# Patient Record
Sex: MALE | Race: BLACK OR AFRICAN AMERICAN
[De-identification: names, ages, dates, MRNs, and addresses within clinical notes are randomized per-mention and may not be internally consistent; named-entity substitution may affect disease eponyms.]

---

## 2021-02-15 ENCOUNTER — HOSPITAL ENCOUNTER (EMERGENCY)
Dept: HOSPITAL 63 - ER | Age: 36
LOS: 1 days | Discharge: HOME | End: 2021-02-16
Payer: OTHER GOVERNMENT

## 2021-02-15 VITALS — HEIGHT: 69 IN | WEIGHT: 215.39 LBS | BODY MASS INDEX: 31.9 KG/M2

## 2021-02-15 DIAGNOSIS — F43.10: ICD-10-CM

## 2021-02-15 DIAGNOSIS — F41.9: ICD-10-CM

## 2021-02-15 DIAGNOSIS — R07.2: ICD-10-CM

## 2021-02-15 DIAGNOSIS — F17.220: ICD-10-CM

## 2021-02-15 DIAGNOSIS — I21.3: Primary | ICD-10-CM

## 2021-02-15 DIAGNOSIS — I10: ICD-10-CM

## 2021-02-15 DIAGNOSIS — Z20.822: ICD-10-CM

## 2021-02-15 PROCEDURE — 96365 THER/PROPH/DIAG IV INF INIT: CPT

## 2021-02-15 PROCEDURE — 96376 TX/PRO/DX INJ SAME DRUG ADON: CPT

## 2021-02-15 PROCEDURE — 93005 ELECTROCARDIOGRAM TRACING: CPT

## 2021-02-15 PROCEDURE — 96375 TX/PRO/DX INJ NEW DRUG ADDON: CPT

## 2021-02-15 PROCEDURE — 71045 X-RAY EXAM CHEST 1 VIEW: CPT

## 2021-02-15 PROCEDURE — 85025 COMPLETE CBC W/AUTO DIFF WBC: CPT

## 2021-02-15 PROCEDURE — 84484 ASSAY OF TROPONIN QUANT: CPT

## 2021-02-15 PROCEDURE — 80048 BASIC METABOLIC PNL TOTAL CA: CPT

## 2021-02-15 PROCEDURE — 85730 THROMBOPLASTIN TIME PARTIAL: CPT

## 2021-02-15 PROCEDURE — 36415 COLL VENOUS BLD VENIPUNCTURE: CPT

## 2021-02-15 PROCEDURE — 87426 SARSCOV CORONAVIRUS AG IA: CPT

## 2021-02-15 PROCEDURE — 85610 PROTHROMBIN TIME: CPT

## 2021-02-15 PROCEDURE — 99285 EMERGENCY DEPT VISIT HI MDM: CPT

## 2021-02-15 PROCEDURE — U0003 INFECTIOUS AGENT DETECTION BY NUCLEIC ACID (DNA OR RNA); SEVERE ACUTE RESPIRATORY SYNDROME CORONAVIRUS 2 (SARS-COV-2) (CORONAVIRUS DISEASE [COVID-19]), AMPLIFIED PROBE TECHNIQUE, MAKING USE OF HIGH THROUGHPUT TECHNOLOGIES AS DESCRIBED BY CMS-2020-01-R: HCPCS

## 2021-02-16 VITALS — SYSTOLIC BLOOD PRESSURE: 122 MMHG | DIASTOLIC BLOOD PRESSURE: 75 MMHG

## 2021-02-16 LAB
ANION GAP SERPL CALC-SCNC: 13 MMOL/L (ref 6–14)
BASOPHILS # BLD AUTO: 0.1 X10^3/UL (ref 0–0.2)
BASOPHILS NFR BLD: 1 % (ref 0–3)
CA-I SERPL ISE-MCNC: 19 MG/DL (ref 8–26)
CALCIUM SERPL-MCNC: 8.9 MG/DL (ref 8.5–10.1)
CHLORIDE SERPL-SCNC: 106 MMOL/L (ref 98–107)
CO2 SERPL-SCNC: 25 MMOL/L (ref 21–32)
CREAT SERPL-MCNC: 1.2 MG/DL (ref 0.7–1.3)
EOSINOPHIL NFR BLD: 0.1 X10^3/UL (ref 0–0.7)
EOSINOPHIL NFR BLD: 2 % (ref 0–3)
ERYTHROCYTE [DISTWIDTH] IN BLOOD BY AUTOMATED COUNT: 12 % (ref 11.5–14.5)
GFR SERPLBLD BASED ON 1.73 SQ M-ARVRAT: 83.9 ML/MIN
GLUCOSE SERPL-MCNC: 111 MG/DL (ref 70–99)
HCT VFR BLD CALC: 40.3 % (ref 39–53)
HGB BLD-MCNC: 13.8 G/DL (ref 13–17.5)
LYMPHOCYTES # BLD: 2.7 X10^3/UL (ref 1–4.8)
LYMPHOCYTES NFR BLD AUTO: 51 % (ref 24–48)
MCH RBC QN AUTO: 32 PG (ref 25–35)
MCHC RBC AUTO-ENTMCNC: 34 G/DL (ref 31–37)
MCV RBC AUTO: 93 FL (ref 79–100)
MONO #: 0.5 X10^3/UL (ref 0–1.1)
MONOCYTES NFR BLD: 9 % (ref 0–9)
NEUT #: 2 X10^3UL (ref 1.8–7.7)
NEUTROPHILS NFR BLD AUTO: 37 % (ref 31–73)
PLATELET # BLD AUTO: 238 X10^3/UL (ref 140–400)
POTASSIUM SERPL-SCNC: 3.3 MMOL/L (ref 3.5–5.1)
RBC # BLD AUTO: 4.35 X10^6/UL (ref 4.3–5.7)
SODIUM SERPL-SCNC: 144 MMOL/L (ref 136–145)
WBC # BLD AUTO: 5.4 X10^3/UL (ref 4–11)

## 2021-02-16 NOTE — EKG
[facility] [address], [place] [address]

Test Date:    2021               Test Time:    00:58:40

Pat Name:     BILL GRAF          Department:   

Patient ID:   [id number]           Room:          

Gender:       M                        Technician:   

:          1985               Requested By: CELINA VILLALTA

Order Number: [id number]            Reading MD:     

                                 Measurements

Intervals                              Axis          

Rate:         97                       P:            11

CT:           120                      QRS:          36

QRSD:         80                       T:            67

QT:           358                                    

QTc:          459                                    

                           Interpretive Statements

SINUS RHYTHM

LEFT ATRIAL ABNORMALITY

QRS(T) CONTOUR ABNORMALITY

CONSISTENT WITH INFERIOR INFARCT

PROBABLY OLD

ABNORMAL ECG

RI6.02

No previous ECG available for comparison

## 2021-02-16 NOTE — EKG
[facility] [address], [place] [address]

Test Date:    2021               Test Time:    00:01:57

Pat Name:     BILL MONTALVO          Department:   

Patient ID:   [id number]           Room:          

Gender:       M                        Technician:   

:          1985               Requested By: CELINA VILLALTA

Order Number: [id number]            Reading MD:     

                                 Measurements

Intervals                              Axis          

Rate:         105                      P:            38

NM:           126                      QRS:          5

QRSD:         82                       T:            -6

QT:           356                                    

QTc:          475                                    

                           Interpretive Statements

SINUS TACHYCARDIA

LEFT ATRIAL ABNORMALITY

T ABNORMALITY IN INFERIOR LEADS

ABNORMAL ECG

RI6.02

No previous ECG available for comparison

## 2021-02-16 NOTE — PHYS DOC
Past History


Past Medical History:  Anxiety, Hypertension, Other


Additional Past Medical Histor:  PTSD, HERNIA


Past Surgical History:  Other


Additional Past Surgical Histo:  HERNIA REPAIR


Additional Smoking Information:  


CHEWING TOBACCO


Alcohol Use:  Heavy


Additional Alcohol Information:  


1/2 PINT RUM





Adult General


Chief Complaint


Chief Complaint:  CHEST PAIN





HPI


HPI


Patient is a 35-year-old male who presents with substernal chest pain for 

approximately 3 hours before coming to the ED.  States it is substernal in 

nature with some radiation to his left side, 8 out of 10, sharp in nature.  

States he never had anything like this before.  Denies any recent dyspnea on 

exertion, PND, orthopnea or edema.  States he is an alcoholic and drinks daily 

but denies any other drug use.  States he is a smoker as well.  States he had 

Covid in December and symptoms resolved.  Denies any recent travel, other 

illnesses, traumas.





Review of Systems


Review of Systems


Review of systems otherwise unremarkable except noted in HPI.





Current Medications


Current Medications





Current Medications








 Medications


  (Trade)  Dose


 Ordered  Sig/Mackenzie  Start Time


 Stop Time Status Last Admin


Dose Admin


 


 Morphine Sulfate


  (Morphine 4mg


 Syringe)  4 mg  STK-MED ONCE  2/16/21 01:00


 2/16/21 01:00 DC  














Allergies


Allergies





Allergies








Coded Allergies Type Severity Reaction Last Updated Verified


 


  No Known Drug Allergies    2/16/21 No











Physical Exam


Physical Exam





Constitutional: Well developed, well nourished, patient appears uncomfortable, 

non-toxic appearance. []


HENT: Normocephalic, atraumatic, bilateral external ears normal, oropharynx 

moist, no oral exudates, nose normal. []


Eyes: conjunctiva normal, no discharge. [] 


Neck: Normal range of motion, no tenderness, supple, no stridor. [] 


Cardiovascular:Heart rate regular rhythm, no murmur []


Lungs & Thorax:  Bilateral breath sounds clear to auscultation []


Abdomen:  soft, no tenderness, no masses, no pulsatile masses. [] 


Skin: Warm, dry, no erythema, no rash. [] 


Back: No tenderness, no CVA tenderness. [] 


Extremities: No tenderness, no cyanosis, no clubbing, ROM intact, no edema. [] 


Neurologic: Alert and oriented X 3, normal motor function, normal sensory 

function, no focal deficits noted. []


Psychologic: Affect normal, judgement normal, mood normal. []





Current Patient Data


Vital Signs





                                   Vital Signs








  Date Time  Temp Pulse Resp B/P (MAP) Pulse Ox O2 Delivery O2 Flow Rate FiO2


 


2/15/21 23:58 98.1 95 18 104/56 (72) 98 Room Air  








Lab Results





                                Laboratory Tests








Test


 2/16/21


00:10


 


White Blood Count


 5.4 x10^3/uL


(4.0-11.0)


 


Red Blood Count


 4.35 x10^6/uL


(4.30-5.70)


 


Hemoglobin


 13.8 g/dL


(13.0-17.5)


 


Hematocrit


 40.3 %


(39.0-53.0)


 


Mean Corpuscular Volume


 93 fL ()





 


Mean Corpuscular Hemoglobin 32 pg (25-35)  


 


Mean Corpuscular Hemoglobin


Concent 34 g/dL


(31-37)


 


Red Cell Distribution Width


 12.0 %


(11.5-14.5)


 


Platelet Count


 238 x10^3/uL


(140-400)


 


Neutrophils (%) (Auto) 37 % (31-73)  


 


Lymphocytes (%) (Auto) 51 % (24-48)  H


 


Monocytes (%) (Auto) 9 % (0-9)  


 


Eosinophils (%) (Auto) 2 % (0-3)  


 


Basophils (%) (Auto) 1 % (0-3)  


 


Neutrophils # (Auto)


 2.0 x10^3uL


(1.8-7.7)


 


Lymphocytes # (Auto)


 2.7 x10^3/uL


(1.0-4.8)


 


Monocytes # (Auto)


 0.5 x10^3/uL


(0.0-1.1)


 


Eosinophils # (Auto)


 0.1 x10^3/uL


(0.0-0.7)


 


Basophils # (Auto)


 0.1 x10^3/uL


(0.0-0.2)


 


Sodium Level


 144 mmol/L


(136-145)


 


Potassium Level


 3.3 mmol/L


(3.5-5.1)  L


 


Chloride Level


 106 mmol/L


()


 


Carbon Dioxide Level


 25 mmol/L


(21-32)


 


Anion Gap 13 (6-14)  


 


Blood Urea Nitrogen


 19 mg/dL


(8-26)


 


Creatinine


 1.2 mg/dL


(0.7-1.3)


 


Estimated GFR


(Cockcroft-Gault) 83.9  





 


Glucose Level


 111 mg/dL


(70-99)  H


 


Calcium Level


 8.9 mg/dL


(8.5-10.1)


 


Troponin I Quantitative


 0.315 ng/mL


(0-0.055)  H











EKG


EKG


Initial EKG with a rate of 105, QRS of 82, QTc of 475, no STEMI.  Patient does 

have T wave inversions in lead III 





Repeat EKG 50 minutes later with a rate of 97, QRS of 80, QTc of 459, ST 

elevations in 3 and aVF of a half a millimeter to 1 mm with reciprocal changes 

in 1 aVL V5 and V6.  Probable developing STEMI []





Radiology/Procedures


Radiology/Procedures


[]





Heart Score


Risk Factors:


Risk Factors:  DM, Current or recent (<one month) smoker, HTN, HLP, family 

history of CAD, obesity.


Risk Scores:


Risk Factors:  DM, Current or recent (<one month) smoker, HTN, HLP, family 

history of CAD, obesity.





Course & Med Decision Making


Course & Med Decision Making


Patient is a 35-year-old male who presents with chest pain approximately 3 hours

 before coming to the ED


Vital signs notable for tachycardia.  Physical exam noted above.  EKGs x2 looks 

like probable developing STEMI.  Troponin greater than 0.3.


Patient given aspirin in route via EMS.  Patient placed on the monitor with IV 

access established.  Given morphine for pain control as well as nitroglycerin.


Labs obtained.  Started on heparin.  STEMI activated.  Transferred patient to 

[place] for continued evaluation and treatment.


Explained situation to family who verbalized understanding and agreed with plan 

of transfer and admission for continued cardiac work-up.





[]





Dragon Disclaimer


Dragon Disclaimer


This electronic medical record was generated, in whole or in part, using a voice

 recognition dictation system.





Departure


Departure:


Impression:  


   Primary Impression:  


   Chest pain


   Additional Impression:  


   STEMI (ST elevation myocardial infarction)


Disposition:  01 DC HOME SELF CARE/HOMELESS


Condition:  STABLE


Referrals:  


PCP,UNKNOWN (PCP)





Problem Qualifiers











CELINA VILLALTA MD               Feb 16, 2021 01:11

## 2021-02-16 NOTE — RAD
AP portable chest  radiograph 2/16/2021



Clinical History: Chest pain.



An AP erect portable digital radiograph of the chest was obtained.



The cardiac and mediastinal silhouettes are within normal limits in size and configuration. The degre
e of inspiration is shallow. No area of consolidation is seen. No pneumothorax or pleural effusion is
 noted. The osseous structures are grossly intact.



IMPRESSION: No area of consolidation is seen.



Electronically signed by: Amadou Chavira MD (2/16/2021 12:28 AM) [id number]

## 2021-02-26 ENCOUNTER — HOSPITAL ENCOUNTER (EMERGENCY)
Dept: HOSPITAL 63 - ER | Age: 36
Discharge: ANOTHER HOSPITAL | End: 2021-02-26
Payer: OTHER GOVERNMENT

## 2021-02-26 VITALS — HEIGHT: 69 IN | WEIGHT: 215.39 LBS | BODY MASS INDEX: 31.9 KG/M2

## 2021-02-26 VITALS
DIASTOLIC BLOOD PRESSURE: 66 MMHG | SYSTOLIC BLOOD PRESSURE: 138 MMHG | SYSTOLIC BLOOD PRESSURE: 138 MMHG | DIASTOLIC BLOOD PRESSURE: 66 MMHG

## 2021-02-26 DIAGNOSIS — Z90.89: ICD-10-CM

## 2021-02-26 DIAGNOSIS — F10.10: ICD-10-CM

## 2021-02-26 DIAGNOSIS — I21.4: Primary | ICD-10-CM

## 2021-02-26 DIAGNOSIS — I10: ICD-10-CM

## 2021-02-26 DIAGNOSIS — F43.10: ICD-10-CM

## 2021-02-26 DIAGNOSIS — R07.89: ICD-10-CM

## 2021-02-26 DIAGNOSIS — F41.9: ICD-10-CM

## 2021-02-26 LAB
ALBUMIN SERPL-MCNC: 3.8 G/DL (ref 3.4–5)
ALBUMIN/GLOB SERPL: 1.3 {RATIO} (ref 1–1.7)
ALP SERPL-CCNC: 64 U/L (ref 46–116)
ALT SERPL-CCNC: 61 U/L (ref 16–63)
AMPHETAMINE/METHAMPHETAMINE: (no result)
ANION GAP SERPL CALC-SCNC: 13 MMOL/L (ref 6–14)
AST SERPL-CCNC: 33 U/L (ref 15–37)
BARBITURATES UR-MCNC: (no result) UG/ML
BENZODIAZ UR-MCNC: (no result) UG/L
BILIRUB SERPL-MCNC: 0.6 MG/DL (ref 0.2–1)
BUN/CREAT SERPL: 16 (ref 6–20)
CA-I SERPL ISE-MCNC: 21 MG/DL (ref 8–26)
CALCIUM SERPL-MCNC: 8.9 MG/DL (ref 8.5–10.1)
CANNABINOIDS UR-MCNC: (no result) UG/L
CHLORIDE SERPL-SCNC: 104 MMOL/L (ref 98–107)
CO2 SERPL-SCNC: 23 MMOL/L (ref 21–32)
COCAINE UR-MCNC: (no result) NG/ML
CREAT SERPL-MCNC: 1.3 MG/DL (ref 0.7–1.3)
ERYTHROCYTE [DISTWIDTH] IN BLOOD BY AUTOMATED COUNT: 12.3 % (ref 11.5–14.5)
GFR SERPLBLD BASED ON 1.73 SQ M-ARVRAT: 76.5 ML/MIN
GLOBULIN SER-MCNC: 2.9 G/DL (ref 2.2–3.8)
GLUCOSE SERPL-MCNC: 109 MG/DL (ref 70–99)
HCT VFR BLD CALC: 37.2 % (ref 39–53)
HGB BLD-MCNC: 12.7 G/DL (ref 13–17.5)
MCH RBC QN AUTO: 31 PG (ref 25–35)
MCHC RBC AUTO-ENTMCNC: 34 G/DL (ref 31–37)
MCV RBC AUTO: 91 FL (ref 79–100)
METHADONE SERPL-MCNC: (no result) NG/ML
OPIATES UR-MCNC: (no result) NG/ML
PCP SERPL-MCNC: (no result) MG/DL
PLATELET # BLD AUTO: 259 X10^3/UL (ref 140–400)
POTASSIUM SERPL-SCNC: 3.5 MMOL/L (ref 3.5–5.1)
PROT SERPL-MCNC: 6.7 G/DL (ref 6.4–8.2)
RBC # BLD AUTO: 4.06 X10^6/UL (ref 4.3–5.7)
SODIUM SERPL-SCNC: 140 MMOL/L (ref 136–145)
WBC # BLD AUTO: 5.6 X10^3/UL (ref 4–11)

## 2021-02-26 PROCEDURE — 99285 EMERGENCY DEPT VISIT HI MDM: CPT

## 2021-02-26 PROCEDURE — 93005 ELECTROCARDIOGRAM TRACING: CPT

## 2021-02-26 PROCEDURE — 84484 ASSAY OF TROPONIN QUANT: CPT

## 2021-02-26 PROCEDURE — 85379 FIBRIN DEGRADATION QUANT: CPT

## 2021-02-26 PROCEDURE — 85027 COMPLETE CBC AUTOMATED: CPT

## 2021-02-26 PROCEDURE — 85610 PROTHROMBIN TIME: CPT

## 2021-02-26 PROCEDURE — 85730 THROMBOPLASTIN TIME PARTIAL: CPT

## 2021-02-26 PROCEDURE — 96374 THER/PROPH/DIAG INJ IV PUSH: CPT

## 2021-02-26 PROCEDURE — 80307 DRUG TEST PRSMV CHEM ANLYZR: CPT

## 2021-02-26 PROCEDURE — 71045 X-RAY EXAM CHEST 1 VIEW: CPT

## 2021-02-26 PROCEDURE — 80053 COMPREHEN METABOLIC PANEL: CPT

## 2021-02-26 PROCEDURE — 36415 COLL VENOUS BLD VENIPUNCTURE: CPT

## 2021-02-26 NOTE — EKG
[facility] [address], [place] [address]

Test Date:    2021               Test Time:    00:10:02

Pat Name:     BILL MONTALVO          Department:   

Patient ID:   [id number]           Room:          

Gender:       M                        Technician:   

:          1985               Requested By: CELINA VILLALTA

Order Number: [id number]            Reading MD:     

                                 Measurements

Intervals                              Axis          

Rate:         98                       P:            38

KS:           122                      QRS:          -1

QRSD:         82                       T:            19

QT:           374                                    

QTc:          479                                    

                           Interpretive Statements

SINUS RHYTHM

LEFT ATRIAL ABNORMALITY

LEFTWARD AXIS

PROLONGED QT

ABNORMAL ECG

RI6.02

No previous ECG available for comparison

## 2021-02-26 NOTE — RAD
Study: XR CHEST 1V



Indication: Chest pain.



Comparison: 2/16/2021



Findings:



The cardiomediastinal silhouette and marlys are within normal limits. No localized airspace opacity, pl
eural effusion or pneumothorax.



Impression:



No acute radiographic abnormality of the chest. No relevant change from the 2/16/2021 comparison. 



Electronically signed by: APNDA STREETER MD (2/26/2021 12:36 AM) [facility]

## 2021-02-26 NOTE — PHYS DOC
Past History


Past Medical History:  Anxiety, Hypertension, Other


Additional Past Medical Histor:  PTSD, HERNIA


Past Surgical History:  Angioplasty, Other


Additional Past Surgical Histo:  HERNIA REPAIR


Additional Smoking Information:  


patient chews tobacco


Alcohol Use:  Heavy


Additional Alcohol Information:  


last drink today





Adult General


Chief Complaint


Chief Complaint:  CHEST PAIN





HPI


HPI


Patient is a 35-year-old male that presents with 2 hours of substernal chest 

pain, 6 out of 10, dull and achy in nature.  Patient was in the emergency 

department approximately a week ago with elevated troponins and abnormal EKG and

was admitted to [facility] for work-up.  Patient underwent a cardiac 

catheterization which showed no CAD.  Patient states that since then he has been

doing well up until this episode.  Denies any recent illnesses, fevers, 

shortness of breath, abdominal pain, nausea, vomiting, diaphoresis, dysuria, 

hematuria or blood in the stool.  Denies any drug use.  States he does chew 

tobacco.  States he had not had any alcohol over the last week.  Denies any 

recent travel, known ill contacts.  Denies any traumas.





Review of Systems


Review of Systems


Review of systems otherwise unremarkable except noted in HPI





Allergies


Allergies





Allergies








Coded Allergies Type Severity Reaction Last Updated Verified


 


  No Known Drug Allergies    2/16/21 No











Physical Exam


Physical Exam





Constitutional: Well developed, well nourished, no acute distress, non-toxic 

appearance. []


HENT: Normocephalic, atraumatic, bilateral external ears normal, oropharynx 

moist, no oral exudates, nose normal. []


Eyes: PERRLA, EOMI, conjunctiva normal, no discharge. [] 


Neck: Normal range of motion, no tenderness, supple, no stridor. [] 


Cardiovascular:Heart rate regular rhythm, no murmur []


Lungs & Thorax:  Bilateral breath sounds clear to auscultation []


Abdomen: Bowel sounds normal, soft, no tenderness, no masses, no pulsatile 

masses. [] 


Skin: Warm, dry, no erythema, no rash. [] 


Back: No tenderness, no CVA tenderness. [] 


Extremities: No tenderness, no cyanosis, no clubbing, ROM intact, no edema. [] 


Neurologic: Alert and oriented X 3, normal motor function, normal sensory 

function, no focal deficits noted. []


Psychologic: Affect normal, judgement normal, mood normal. []





Current Patient Data


Vital Signs





                                   Vital Signs








  Date Time  Temp Pulse Resp B/P (MAP) Pulse Ox O2 Delivery O2 Flow Rate FiO2


 


2/26/21 00:08 97.7 103 18 127/91 (103) 96 Room Air  








Lab Results





                                Laboratory Tests








Test


 2/26/21


00:00 2/26/21


00:07


 


Prothrombin Time


 9.7 SEC


(9.4-11.4) 





 


Prothrombin Time INR 0.9 (0.9-1.1)   


 


Activated Partial


Thromboplast Time 23 SEC (23-33)


 





 


Sodium Level


 140 mmol/L


(136-145) 





 


Potassium Level


 3.5 mmol/L


(3.5-5.1) 





 


Chloride Level


 104 mmol/L


() 





 


Carbon Dioxide Level


 23 mmol/L


(21-32) 





 


Anion Gap 13 (6-14)   


 


Blood Urea Nitrogen


 21 mg/dL


(8-26) 





 


Creatinine


 1.3 mg/dL


(0.7-1.3) 





 


Estimated GFR


(Cockcroft-Gault) 76.5  


 





 


BUN/Creatinine Ratio 16 (6-20)   


 


Glucose Level


 109 mg/dL


(70-99)  H 





 


Calcium Level


 8.9 mg/dL


(8.5-10.1) 





 


Total Bilirubin


 0.6 mg/dL


(0.2-1.0) 





 


Aspartate Amino Transferase


(AST) 33 U/L (15-37)


 





 


Alanine Aminotransferase (ALT)


 61 U/L (16-63)


 





 


Alkaline Phosphatase


 64 U/L


() 





 


Troponin I Quantitative


 0.136 ng/mL


(0-0.055)  H 





 


Total Protein


 6.7 g/dL


(6.4-8.2) 





 


Albumin


 3.8 g/dL


(3.4-5.0) 





 


Albumin/Globulin Ratio 1.3 (1.0-1.7)   


 


White Blood Count


 


 5.6 x10^3/uL


(4.0-11.0)


 


Red Blood Count


 


 4.06 x10^6/uL


(4.30-5.70)  L


 


Hemoglobin


 


 12.7 g/dL


(13.0-17.5)  L


 


Hematocrit


 


 37.2 %


(39.0-53.0)  L


 


Mean Corpuscular Volume


 


 91 fL ()





 


Mean Corpuscular Hemoglobin  31 pg (25-35)  


 


Mean Corpuscular Hemoglobin


Concent 


 34 g/dL


(31-37)


 


Red Cell Distribution Width


 


 12.3 %


(11.5-14.5)


 


Platelet Count


 


 259 x10^3/uL


(140-400)











EKG


EKG


EKG with a rate of 98, QRS of 82, QTc of 479, no STEMI.  Left atrial abnormality

 noted.  []





Radiology/Procedures


Radiology/Procedures


[]





Heart Score


Risk Factors:


Risk Factors:  DM, Current or recent (<one month) smoker, HTN, HLP, family 

history of CAD, obesity.


Risk Scores:


Risk Factors:  DM, Current or recent (<one month) smoker, HTN, HLP, family 

history of CAD, obesity.





Course & Med Decision Making


Course & Med Decision Making


Patient is a 35-year-old male who presents with 2 hours of substernal chest 

pain, status post 1 week out from cardiac catheterization which was clean


Vital signs notable for tachycardia.  Physical exam noted above.  EKG noted 

above with no STEMI.  Troponin elevated to 0.1.  Chest x-ray not concerning.  

Patient given morphine for chest pain.


Tox screen notable for positive urine ethyl alcohol.  Laboratory analysis 

otherwise unremarkable.  Discussed findings with family and recommended adm

ission to the hospital for continued evaluation of his chest pain and elevated 

troponin.


Family grateful, verbalized understanding and agreed with plan of admission and 

transfer.


[]





Dragon Disclaimer


Dragon Disclaimer


This electronic medical record was generated, in whole or in part, using a voice

 recognition dictation system.





Departure


Departure:


Impression:  


   Primary Impression:  


   Non-ST elevation (NSTEMI) myocardial infarction


Disposition:  02 DC/TRF OTHER SHORT TERM HOS


Condition:  STABLE


Referrals:  


PARKER ALVAREZ DO (PCP)











CELINA VILLALTA MD               Feb 26, 2021 01:23

## 2021-04-14 ENCOUNTER — HOSPITAL ENCOUNTER (OUTPATIENT)
Dept: HOSPITAL 63 - US | Age: 36
End: 2021-04-14
Payer: OTHER GOVERNMENT

## 2021-04-14 DIAGNOSIS — R60.9: Primary | ICD-10-CM

## 2021-04-14 PROCEDURE — 93970 EXTREMITY STUDY: CPT

## 2021-04-15 NOTE — RAD
MR#: [id number]

Account#: [id number]

Accession#: [id number]

Date of Study: 04/14/2021

Ordering Physician: ELIAZAR MURRAY, 

Referring Physician: ELIAZAR MURRAY, 

Tech: Jessica Paul RVT,GAEL





--------------- APPROVED REPORT --------------





Patient Location : OUT-PATIENT



Indications

Lower Extremity Edema :     

Grayscale images the bilateral lower extremity greater and lesser saphenous veins did not reveal any 
significant thrombus.  These were limited images only.  The right great saphenous vein measures 5 mm 
and the left great saphenous vein measures 5 mm.  The bilateral greater and lesser saphenous veins di
d not show any evidence of reflux.



Greater Saphenous Veins (GSV)

Significant venous relux noted in the LEFT GSV at the following levels : Superficial Femoral Junction




Critical Notification

Critical Value: No



<Conclusion>

1.  Negative for reflux in the bilateral greater and lesser saphenous veins



Signed by : Leroy Oconnor, 

Electronically Approved : 04/15/2021 10:08:55